# Patient Record
Sex: MALE | Race: OTHER
[De-identification: names, ages, dates, MRNs, and addresses within clinical notes are randomized per-mention and may not be internally consistent; named-entity substitution may affect disease eponyms.]

---

## 2018-04-27 ENCOUNTER — HOSPITAL ENCOUNTER (EMERGENCY)
Dept: HOSPITAL 62 - ER | Age: 53
Discharge: HOME | End: 2018-04-27
Payer: SELF-PAY

## 2018-04-27 VITALS — DIASTOLIC BLOOD PRESSURE: 74 MMHG | SYSTOLIC BLOOD PRESSURE: 123 MMHG

## 2018-04-27 DIAGNOSIS — I10: ICD-10-CM

## 2018-04-27 DIAGNOSIS — R05: ICD-10-CM

## 2018-04-27 DIAGNOSIS — R11.0: ICD-10-CM

## 2018-04-27 DIAGNOSIS — M79.1: Primary | ICD-10-CM

## 2018-04-27 PROCEDURE — 99283 EMERGENCY DEPT VISIT LOW MDM: CPT

## 2018-04-27 NOTE — ER DOCUMENT REPORT
HPI





- HPI


Pain Level: 3


Context: 





Patient is a 53-year-old male who presents with his wife with a chief complaint 

of flulike symptoms.  Wife states that she was diagnosed with influenza last 

Friday was started on medication that she does not know the name of.  She 

states that her  started feeling ill on Tuesday with body aches, cough.  

Has had an intermittent fever on and off throughout the week.  Patient denies 

any tobacco use, alcohol or drug use.  States he has been taking Tylenol at 

home for pain but otherwise denies any other over-the-counter medications.  

Past medical history significant for hypertension.





- CONSTITUTIONAL


Constitutional: REPORTS: Fever, Chills





- EENT


EENT: REPORTS: Sore Throat.  DENIES: Ear Pain, Eye problems





- NEURO


Neurology: REPORTS: Headache, Weakness.  DENIES: Vision blurred, Dizzinesss / 

Vertigo





- CARDIOVASCULAR


Cardiovascular: DENIES: Chest pain





- RESPIRATORY


Respiratory: REPORTS: Coughing.  DENIES: Trouble Breathing





- GASTROINTESTINAL


Gastrointestinal: REPORTS: Abdominal Pain.  DENIES: Black / Bloody Stools





- URINARY


Urinary: DENIES: Dysuria, Urgency, Frequency





- REPRODUCTIVE


Reproductive: DENIES: Pregnant:





- MUSCULOSKELETAL


Musculoskeletal: REPORTS: Extremity pain





Past Medical History





- Social History


Smoking Status: Never Smoker


Chew tobacco use (# tins/day): No


Frequency of alcohol use: None


Drug Abuse: None


Family History: Reviewed & Not Pertinent


Patient has suicidal ideation: No


Patient has homicidal ideation: No





- Past Medical History


Cardiac Medical History: Reports: Hx Hypertension


Renal/ Medical History: Denies: Hx Peritoneal Dialysis


GI Medical History: Reports: Hx Gastroesophageal Reflux Disease





Vertical Provider Document





- CONSTITUTIONAL


Agree With Documented VS: Yes


Notes: 





PHYSICAL EXAM


GENERAL: Alert, interacts well. 


HEENT: NCAT, pale conjunctiva, extraocular movements intact, pupils PERRL. 

external ear normal, no evidence of external auditory canal tenderness, blood/

drainage, cerumen impaction, TM intact without evidence of effusion, bulging, 

injection, MMM, Uvula midline.  Airway patent. No evidence of tonsillar 

enlargement, peritonsillar abscess, retropharyngeal abscess.


NECK: Full range of motion. Supple. Trachea midline.


LUNGS: Clear to auscultation bilaterally, no wheezes, rales, or rhonchi. No 

respiratory distress.


HEART: Regular rate and rhythm. No murmurs, gallops, or rubs.


ABDOMEN: Soft,  nondistended, nontender. No guarding, rebound, or rigidity.. 

Bowel sounds present in all 4 quadrants.


EXTREMITIES: Moves all 4 extremities spontaneously. No edema, radial and 

dorsalis pedis pulses 2/4 bilaterally. No cyanosis. 


NEUROLOGICAL: Alert and oriented x4. Normal speech.


PSYCH: Normal affect, normal mood.


SKIN: Warm, dry, normal turgor. No rashes or lesions noted.








- INFECTION CONTROL


TRAVEL OUTSIDE OF THE [place] IN LAST 30 DAYS: No





Course





- Re-evaluation


Re-evalutation: 





04/27/18 11:49 


Patient presents with cough, reflux, nausea and fever at home consistent with a 

diagnosis of influenza given recent sick contacts at home.  Patient is overall 

well in appearance, in no acute distress.  Lung sounds clear.  Able to tolerate 

oral intake without difficulty here in the emergency department.  Patient is 

outside of the window for Tamiflu. At this time will discharge with return 

precautions and follow-up recommendations.  Verbal discharge instructions given 

a the bedside and opportunity for questions given. Medication warnings 

reviewed. Patient is in agreement with this plan and has verbalized 

understanding of return precautions and the need for primary care follow-up in 

the next 24-72 hours.





- Vital Signs


Vital signs: 


 











Temp Pulse Resp BP Pulse Ox


 


 99.1 F   79   16   123/74   97 


 


 04/27/18 11:17  04/27/18 11:17  04/27/18 11:17  04/27/18 11:17  04/27/18 11:17














Discharge





- Discharge


Clinical Impression: 


 Flu-like symptoms





Condition: Good


Disposition: HOME, SELF-CARE


Additional Instructions: 


You have influenza.  There is no treatment that is effective for this diagnosis 

other than supportive care at home. This includes drinking plenty of fluids, 

using Tylenol or ibuprofen as needed for fever and discomfort, and Zofran as 

needed for nausea and vomiting.  Please follow closely with you primary care 

physician the next 1-2 days regarding this diagnosis.  Return to the emergency 

department immediately if you began to have persistent vomiting prevents you 

from being able to keep fluids down for more than 12 hours, you pass out, you 

began having difficulty breathing, you become confused, or you have any other 

symptoms that are worrisome to you.





You can take pepcid for heartburn, this is available over the counter


Prescriptions: 


Benzonatate [Tessalon Perles 100 mg Capsule] 100 mg PO ASDIR PRN #40 capsule


 PRN Reason: 


Ondansetron [Zofran Odt 4 mg Tablet] 1 - 2 tab PO Q4H PRN #15 tab.rapdis


 PRN Reason: For Nausea/Vomiting


Referrals: 


ZEESHAN GUTIERREZ MD [ACTIVE STAFF] - Follow up in 1 week

## 2018-05-11 ENCOUNTER — HOSPITAL ENCOUNTER (OUTPATIENT)
Dept: HOSPITAL 62 - RAD | Age: 53
End: 2018-05-11
Payer: COMMERCIAL

## 2018-05-11 DIAGNOSIS — R63.4: ICD-10-CM

## 2018-05-11 DIAGNOSIS — K21.9: ICD-10-CM

## 2018-05-11 DIAGNOSIS — R11.10: ICD-10-CM

## 2018-05-11 DIAGNOSIS — K44.9: Primary | ICD-10-CM

## 2018-05-11 DIAGNOSIS — R13.10: ICD-10-CM

## 2018-05-11 PROCEDURE — 74220 X-RAY XM ESOPHAGUS 1CNTRST: CPT

## 2018-05-11 NOTE — RADIOLOGY REPORT (SQ)
EXAM DESCRIPTION:  BARIUM SWALLOW ESOPHAGUS



COMPLETED DATE/TIME:  5/11/2018 9:10 am



REASON FOR STUDY:  SOLID FOOD DYSPHAGIA/WT LOSS/VOMITING R13.10  DYSPHAGIA, UNSPECIFIED R63.4  ABNORM
AL WEIGHT LOSS R11.10  VOMITING, UNSPECIFIED



COMPARISON:  None.



TECHNIQUE:  Under fluoroscopic guidance, patient ingested effervescent granules followed by thick and
 thin barium. Fluoroscopic spot images and routine radiographic images acquired and stored on PACS.

12 MM BARIUM TABLET GIVEN: Yes.

No significant delay in passage.



LIMITATIONS:  None.



FLUOROSCOPY TIME:  FLUORO TIME: 20 SECONDS

11 fluoroscopy images saved to PACS.



FINDINGS:  NEUROMUSCULAR COORDINATION OF SWALLOW: Normal. No aspiration.

ESOPHAGEAL MOTILITY: Normal peristalsis. No esophageal spasm.

ESOPHAGEAL MUCOSA: Normal mucosa without masses or ulceration.

GASTRO-ESOPHAGEAL JUNCTION: Small sliding hiatal hernia.  Unprovoked gastroesophageal reflux extendin
g into the upper 1/3 of the esophagus.

NON-GI TRACT STRUCTURES: No significant finding.

OTHER: No other significant finding.



IMPRESSION:  1. SMALL SLIDING HIATAL HERNIA.

2. SEVERE GASTROESOPHAGEAL REFLUX.



COMMENT:  Quality :  Final reports for procedures using fluoroscopy that document radiation exp
osure indices, or exposure time and number of fluorographic images (if radiation exposure indices are
 not available)



TECHNICAL DOCUMENTATION:  JOB ID:  [id number]

 2011 [employer]- All Rights Reserved



Reading location - IP/workstation name: [facility]

## 2018-05-19 ENCOUNTER — HOSPITAL ENCOUNTER (OUTPATIENT)
Dept: HOSPITAL 62 - CCC | Age: 53
End: 2018-05-19
Payer: COMMERCIAL

## 2018-05-19 DIAGNOSIS — R11.10: ICD-10-CM

## 2018-05-19 DIAGNOSIS — R13.10: Primary | ICD-10-CM

## 2018-05-19 DIAGNOSIS — R63.4: ICD-10-CM

## 2018-05-19 LAB
ADD MANUAL DIFF: NO
ALBUMIN SERPL-MCNC: 4.7 G/DL (ref 3.5–5)
ALP SERPL-CCNC: 47 U/L (ref 38–126)
ALT SERPL-CCNC: 23 U/L (ref 21–72)
ANION GAP SERPL CALC-SCNC: 11 MMOL/L (ref 5–19)
AST SERPL-CCNC: 21 U/L (ref 17–59)
BASOPHILS # BLD AUTO: 0 10^3/UL (ref 0–0.2)
BASOPHILS NFR BLD AUTO: 0.3 % (ref 0–2)
BILIRUB DIRECT SERPL-MCNC: 0.3 MG/DL (ref 0–0.4)
BILIRUB SERPL-MCNC: 0.5 MG/DL (ref 0.2–1.3)
BUN SERPL-MCNC: 19 MG/DL (ref 7–20)
CALCIUM: 10.5 MG/DL (ref 8.4–10.2)
CHLORIDE SERPL-SCNC: 101 MMOL/L (ref 98–107)
CHOLEST SERPL-MCNC: 195.02 MG/DL (ref 0–200)
CO2 SERPL-SCNC: 32 MMOL/L (ref 22–30)
EOSINOPHIL # BLD AUTO: 0.1 10^3/UL (ref 0–0.6)
EOSINOPHIL NFR BLD AUTO: 0.6 % (ref 0–6)
ERYTHROCYTE [DISTWIDTH] IN BLOOD BY AUTOMATED COUNT: 12.6 % (ref 11.5–14)
GLUCOSE SERPL-MCNC: 103 MG/DL (ref 75–110)
HCT VFR BLD CALC: 42.9 % (ref 37.9–51)
HGB BLD-MCNC: 14.3 G/DL (ref 13.5–17)
LDLC SERPL DIRECT ASSAY-MCNC: 100 MG/DL (ref ?–100)
LYMPHOCYTES # BLD AUTO: 1.5 10^3/UL (ref 0.5–4.7)
LYMPHOCYTES NFR BLD AUTO: 14.9 % (ref 13–45)
MCH RBC QN AUTO: 27.9 PG (ref 27–33.4)
MCHC RBC AUTO-ENTMCNC: 33.3 G/DL (ref 32–36)
MCV RBC AUTO: 84 FL (ref 80–97)
MONOCYTES # BLD AUTO: 0.8 10^3/UL (ref 0.1–1.4)
MONOCYTES NFR BLD AUTO: 8 % (ref 3–13)
NEUTROPHILS # BLD AUTO: 7.5 10^3/UL (ref 1.7–8.2)
NEUTS SEG NFR BLD AUTO: 76.2 % (ref 42–78)
PLATELET # BLD: 290 10^3/UL (ref 150–450)
POTASSIUM SERPL-SCNC: 5.8 MMOL/L (ref 3.6–5)
PROT SERPL-MCNC: 8.8 G/DL (ref 6.3–8.2)
PSA SERPL-MCNC: 0.82 NG/ML (ref ?–4)
RBC # BLD AUTO: 5.13 10^6/UL (ref 4.35–5.55)
SODIUM SERPL-SCNC: 144.1 MMOL/L (ref 137–145)
TOTAL CELLS COUNTED % (AUTO): 100 %
TRIGL SERPL-MCNC: 279 MG/DL (ref ?–150)
VLDLC SERPL CALC-MCNC: 55.8 MG/DL (ref 10–31)
WBC # BLD AUTO: 9.9 10^3/UL (ref 4–10.5)

## 2018-05-19 PROCEDURE — 84443 ASSAY THYROID STIM HORMONE: CPT

## 2018-05-19 PROCEDURE — 83036 HEMOGLOBIN GLYCOSYLATED A1C: CPT

## 2018-05-19 PROCEDURE — 85025 COMPLETE CBC W/AUTO DIFF WBC: CPT

## 2018-05-19 PROCEDURE — 36415 COLL VENOUS BLD VENIPUNCTURE: CPT

## 2018-05-19 PROCEDURE — 84153 ASSAY OF PSA TOTAL: CPT

## 2018-05-19 PROCEDURE — 80053 COMPREHEN METABOLIC PANEL: CPT

## 2018-05-19 PROCEDURE — 80061 LIPID PANEL: CPT

## 2018-06-19 ENCOUNTER — HOSPITAL ENCOUNTER (OUTPATIENT)
Dept: HOSPITAL 62 - CCC | Age: 53
End: 2018-06-19
Payer: COMMERCIAL

## 2018-06-19 DIAGNOSIS — E87.5: ICD-10-CM

## 2018-06-19 DIAGNOSIS — E83.52: Primary | ICD-10-CM

## 2018-06-19 LAB
CALCIUM: 10.2 MG/DL (ref 8.4–10.2)
POTASSIUM SERPL-SCNC: 5.6 MMOL/L (ref 3.6–5)

## 2018-06-19 PROCEDURE — 84132 ASSAY OF SERUM POTASSIUM: CPT

## 2018-06-19 PROCEDURE — 82310 ASSAY OF CALCIUM: CPT

## 2018-06-19 PROCEDURE — 36415 COLL VENOUS BLD VENIPUNCTURE: CPT

## 2018-09-27 ENCOUNTER — HOSPITAL ENCOUNTER (EMERGENCY)
Dept: HOSPITAL 62 - ER | Age: 53
Discharge: HOME | End: 2018-09-27
Payer: COMMERCIAL

## 2018-09-27 VITALS — DIASTOLIC BLOOD PRESSURE: 81 MMHG | SYSTOLIC BLOOD PRESSURE: 141 MMHG

## 2018-09-27 DIAGNOSIS — I10: ICD-10-CM

## 2018-09-27 DIAGNOSIS — M51.37: ICD-10-CM

## 2018-09-27 DIAGNOSIS — M47.9: Primary | ICD-10-CM

## 2018-09-27 PROCEDURE — 99283 EMERGENCY DEPT VISIT LOW MDM: CPT

## 2018-09-27 PROCEDURE — 72110 X-RAY EXAM L-2 SPINE 4/>VWS: CPT

## 2018-09-27 NOTE — RADIOLOGY REPORT (SQ)
EXAM DESCRIPTION:  L SPINE WHOLE



COMPLETED DATE/TIME:  9/27/2018 8:26 am



REASON FOR STUDY:  back pain



COMPARISON:  None.



NUMBER OF VIEWS:  Five views including obliques.



TECHNIQUE:  AP, lateral, oblique, and sacral radiographic images acquired of the lumbar spine.



LIMITATIONS:  None.



FINDINGS:  MINERALIZATION: Normal.

SEGMENTATION: Normal.  No transitional anatomy.

ALIGNMENT: Normal.

VERTEBRAE: Maintained height.  No fracture or worrisome bone lesion.

DISCS:  Moderate severe decrease in disc space at L5-S1.  Minimal anterior osteophytes.

POSTERIOR ELEMENTS: Pedicles and facets are intact.  No pars defect or posterior arch defects.

HARDWARE: None in the spine.

PARASPINAL SOFT TISSUES: Normal.

PELVIS: Intact as visualized. No fractures or worrisome bone lesions. SI joints intact.

OTHER: No other significant finding.



IMPRESSION:  1.  Degenerative disc disease at L5-S1.

2. No acute osseous findings.



TECHNICAL DOCUMENTATION:  JOB ID:  [id number]

 2011 [employer]- All Rights Reserved



Reading location - IP/workstation name: [email or web address]

## 2018-09-27 NOTE — ER DOCUMENT REPORT
ED Neck/Back Problem





- General


Chief Complaint: Back Pain


Stated Complaint: BACK PAIN


Time Seen by Provider: 09/27/18 07:54


TRAVEL OUTSIDE OF THE [place] IN LAST 30 DAYS: No





- HPI


Notes: 





Patient is a 53-year-old male that presents to the emergency department for 

chief complaint of back pain.


Patient has had low back pain previously but states it is more severe today.  

He has had exacerbation of his chronic low back pain for the last 3 days.  He 

states he was lifting and twisting while at work which is what aggravated it.  

He has been taking ibuprofen at home with some relief.  He denies any fevers, 

chills, saddle anesthesia, bowel or bladder incontinence and lower extremity 

weakness.  He denies any direct fall or trauma to the area.  He denies IV drug 

use.





Past Medical History: Negative





Past Surgical History: Negative





Social History: Denies drugs alcohol and tobacco





Family History: Reviewed and noncontributory for presenting illness





Allergies: Reviewed, see documented allergy list.








REVIEW OF SYSTEMS:





CONSTITUTIONAL : 





No fever





No chills





No diaphoresis





No recent illness





EENT:





No vision changes





No congestion





No sore throat  





CARDIOVASCULAR:





No chest pain





No palpitations





RESPIRATORY:





No shortness of breath





No cough





No difficulty breathing





GASTROINTESTINAL: 





No abdominal pain





No nausea





No vomiting





No diarrhea





GENITOURINARY:





No dysuria





No hematuria





No difficulty urinating





MUSCULOSKELETAL:





back pain





No leg pain





No arm pain





SKIN:  





No rashes





No lesions





LYMPHATIC: 





No swollen, enlarged glands.





NEUROLOGICAL: 





No lightheadedness





No headache





No weakness





No paresthesias





PSYCHIATRIC:





No anxiety





No depression 








PHYSICAL EXAMINATION:





Vital signs reviewed, nursing noted reviewed.





GENERAL: Well-appearing, well-nourished and in no acute distress.





HEAD: Atraumatic, normocephalic.





EYES: Eyes appear normal, extraocular movements intact, sclera anicteric, 

conjunctiva are normal.





ENT: nares patent, oropharynx clear without exudates.  Moist mucous membranes.





NECK: Normal range of motion, supple without lymphadenopathy





BACK: Midline lumbar tenderness L4-L5 with no step-off.  Mild bilateral 

paraspinal lumbar tenderness and spasm.  Normal thoracic spine





LUNGS: Breath sounds clear to auscultation bilaterally and equal.  No wheezes 

rales or rhonchi.





HEART: Regular rate and rhythm without murmurs





ABDOMEN: Soft, nontender, normoactive bowel sounds.  No rebound, guarding, or 

rigidity. No masses appreciated.





EXTREMITIES: Nontender, good range of motion, no pitting or edema. 





NEUROLOGICAL: No focal neurological deficits. Moves all extremities 

spontaneously Motor and sensory grossly intact on exam.





PSYCH: Normal mood, normal affect.





SKIN: Warm, Dry, normal turgor, no rashes or lesions noted on exposed skin











- Related Data


Allergies/Adverse Reactions: 


 





No Known Allergies Allergy (Unverified 04/27/18 11:13)


 











Past Medical History





- Social History


Smoking Status: Never Smoker


Family History: Reviewed & Not Pertinent





- Past Medical History


Cardiac Medical History: Reports: Hx Hypertension


Renal/ Medical History: Denies: Hx Peritoneal Dialysis


GI Medical History: Reports: Hx Gastroesophageal Reflux Disease





Review of Systems





- Review of Systems


Notes: 





Dictated





Physical Exam





- Vital signs


Vitals: 


 











Temp Pulse Resp BP Pulse Ox


 


 97.5 F   61   16   141/81 H  98 


 


 09/27/18 07:39  09/27/18 07:39  09/27/18 07:39  09/27/18 07:39  09/27/18 07:39














- Notes


Notes: 





Dictated





Course





- Re-evaluation


Re-evalutation: 





09/27/18 08:03


Vitals reviewed.  Nursing notes reviewed.  Patient given Percocet for pain.  X-

ray of the lumbar spine obtained.  He is not having any symptoms consistent 

with cauda equina, epidural abscess or transverse myelitis or discitis.  He is 

able to ambulate and has full range of motion of the lumbar spine.  He is 

nontoxic-appearing.  Patient will be discharged home with referral to primary 

care for follow-up and possible physical therapy referral if symptoms continue.


09/27/18 08:41





 





Lumbar Spine X-Ray  09/27/18 08:00


IMPRESSION:  1.  Degenerative disc disease at L5-S1.


2. No acute osseous findings.


 








On reevaluation patient was symptomatically improved.





- Vital Signs


Vital signs: 


 











Temp Pulse Resp BP Pulse Ox


 


 97.5 F   61   16   141/81 H  98 


 


 09/27/18 07:39  09/27/18 07:39  09/27/18 07:39  09/27/18 07:39  09/27/18 07:39














Discharge





- Discharge


Clinical Impression: 


 Degenerative joint disease of low back





Back pain


Qualifiers:


 Back pain location: low back pain Chronicity: acute Back pain laterality: 

midline Sciatica presence: without sciatica Qualified Code(s): M54.5 - Low back 

pain





Condition: Stable


Disposition: HOME, SELF-CARE


Instructions:  Low Back Pain ([facility])


Additional Instructions: 


Please return to the emergency department if you have any worsening, or concern 

of your symptoms.





Please return to the emergency department if you develop chest pain, difficulty 

breathing, severe abdominal pain, or ongoing vomiting.





Please follow-up with your primary care physician in 2-3 days and any other 

recommended physicians.





If prescribed, take all medications as directed. 





If you have any questions or concerns do not hesitate to return the emergency 

department for evaluation.





Return if you have any lower extremity numbness or weakness, incontinence of 

your urine or stool, fevers, difficulty walking.





Prescriptions: 


Naproxen 500 mg PO BID #30 tablet


Referrals: 


[facility] [NO LOCAL MD] - Follow up in 3-5 days

## 2019-08-07 ENCOUNTER — HOSPITAL ENCOUNTER (OUTPATIENT)
Dept: HOSPITAL 62 - CCC | Age: 54
End: 2019-08-07
Payer: COMMERCIAL

## 2019-08-07 DIAGNOSIS — I10: Primary | ICD-10-CM

## 2019-08-07 LAB
ADD MANUAL DIFF: NO
ALBUMIN SERPL-MCNC: 4.7 G/DL (ref 3.5–5)
ALP SERPL-CCNC: 50 U/L (ref 38–126)
ANION GAP SERPL CALC-SCNC: 10 MMOL/L (ref 5–19)
AST SERPL-CCNC: 29 U/L (ref 17–59)
BASOPHILS # BLD AUTO: 0 10^3/UL (ref 0–0.2)
BASOPHILS NFR BLD AUTO: 0.5 % (ref 0–2)
BILIRUB DIRECT SERPL-MCNC: 0.3 MG/DL (ref 0–0.4)
BILIRUB SERPL-MCNC: 0.5 MG/DL (ref 0.2–1.3)
BUN SERPL-MCNC: 21 MG/DL (ref 7–20)
CALCIUM: 9.5 MG/DL (ref 8.4–10.2)
CHLORIDE SERPL-SCNC: 100 MMOL/L (ref 98–107)
CHOLEST SERPL-MCNC: 235.63 MG/DL (ref 0–200)
CO2 SERPL-SCNC: 30 MMOL/L (ref 22–30)
EOSINOPHIL # BLD AUTO: 0.1 10^3/UL (ref 0–0.6)
EOSINOPHIL NFR BLD AUTO: 1.1 % (ref 0–6)
ERYTHROCYTE [DISTWIDTH] IN BLOOD BY AUTOMATED COUNT: 12.7 % (ref 11.5–14)
GLUCOSE SERPL-MCNC: 104 MG/DL (ref 75–110)
HCT VFR BLD CALC: 44.7 % (ref 37.9–51)
HGB BLD-MCNC: 14.9 G/DL (ref 13.5–17)
LDLC SERPL DIRECT ASSAY-MCNC: 172 MG/DL (ref ?–100)
LYMPHOCYTES # BLD AUTO: 1.3 10^3/UL (ref 0.5–4.7)
LYMPHOCYTES NFR BLD AUTO: 19.8 % (ref 13–45)
MCH RBC QN AUTO: 28 PG (ref 27–33.4)
MCHC RBC AUTO-ENTMCNC: 33.4 G/DL (ref 32–36)
MCV RBC AUTO: 84 FL (ref 80–97)
MONOCYTES # BLD AUTO: 0.5 10^3/UL (ref 0.1–1.4)
MONOCYTES NFR BLD AUTO: 7.3 % (ref 3–13)
NEUTROPHILS # BLD AUTO: 4.7 10^3/UL (ref 1.7–8.2)
NEUTS SEG NFR BLD AUTO: 71.3 % (ref 42–78)
PLATELET # BLD: 242 10^3/UL (ref 150–450)
POTASSIUM SERPL-SCNC: 4.2 MMOL/L (ref 3.6–5)
PROT SERPL-MCNC: 8.5 G/DL (ref 6.3–8.2)
PSA SERPL-MCNC: 0.68 NG/ML (ref ?–4)
RBC # BLD AUTO: 5.32 10^6/UL (ref 4.35–5.55)
TOTAL CELLS COUNTED % (AUTO): 100 %
TRIGL SERPL-MCNC: 241 MG/DL (ref ?–150)
VLDLC SERPL CALC-MCNC: 48.2 MG/DL (ref 10–31)
WBC # BLD AUTO: 6.6 10^3/UL (ref 4–10.5)

## 2019-08-07 PROCEDURE — 84443 ASSAY THYROID STIM HORMONE: CPT

## 2019-08-07 PROCEDURE — 84153 ASSAY OF PSA TOTAL: CPT

## 2019-08-07 PROCEDURE — 36415 COLL VENOUS BLD VENIPUNCTURE: CPT

## 2019-08-07 PROCEDURE — 80061 LIPID PANEL: CPT

## 2019-08-07 PROCEDURE — 80053 COMPREHEN METABOLIC PANEL: CPT

## 2019-08-07 PROCEDURE — 85025 COMPLETE CBC W/AUTO DIFF WBC: CPT

## 2019-08-07 PROCEDURE — 83036 HEMOGLOBIN GLYCOSYLATED A1C: CPT

## 2020-01-14 ENCOUNTER — HOSPITAL ENCOUNTER (OUTPATIENT)
Dept: HOSPITAL 62 - CCC | Age: 55
End: 2020-01-14
Payer: COMMERCIAL

## 2020-01-14 DIAGNOSIS — R07.9: ICD-10-CM

## 2020-01-14 DIAGNOSIS — R06.02: ICD-10-CM

## 2020-01-14 DIAGNOSIS — I10: Primary | ICD-10-CM

## 2020-01-14 PROCEDURE — 93005 ELECTROCARDIOGRAM TRACING: CPT

## 2020-01-14 PROCEDURE — 93010 ELECTROCARDIOGRAM REPORT: CPT

## 2020-01-14 NOTE — EKG REPORT
SEVERITY:- BORDERLINE ECG -

SINUS RHYTHM

BORDERLINE T WAVE ABNORMALITIES

:

Confirmed by: Rizwana Goldman MD 14-Jan-2020 11:20:52

## 2020-01-30 ENCOUNTER — HOSPITAL ENCOUNTER (OUTPATIENT)
Dept: HOSPITAL 62 - SP | Age: 55
End: 2020-01-30
Payer: COMMERCIAL

## 2020-01-30 DIAGNOSIS — E78.00: ICD-10-CM

## 2020-01-30 DIAGNOSIS — R06.02: ICD-10-CM

## 2020-01-30 DIAGNOSIS — I10: ICD-10-CM

## 2020-01-30 DIAGNOSIS — R07.89: Primary | ICD-10-CM

## 2020-01-30 PROCEDURE — 93017 CV STRESS TEST TRACING ONLY: CPT

## 2020-01-30 NOTE — DRAGON STRESS TEST REPORT
EXERCISE TREADMILL TEST.



DATE OF PROCEDURE: January 30, 2020



INDICATION: Patient with unspecified chest pain.  



Coronary risk factors: Elevated cholesterol and blood pressure.



Resting EKG: Sinus rhythm, no baseline ST segment changes.



Stress EKG: No significant changes noted with with exercise treadmill.



Reason for termination: Dyspnea and fatigue.





PROCEDURE REPORT: 



Baseline heart rate: 78 beats per minute with blood pressure of 153/93.  Patient
had no significant complaints at baseline.



Patient was exercised on a standard [facility]smar protocol.  Patient exercised for total
of 7 minutes and 0 seconds.  Exercise was stopped because of fatigue and 
shortness of breath.  Patient denied any chest arm or neck discomfort during the
exercise, at peak exercise or in recovery.



If automatic blood pressure recorded and if felt not accurate manual blood 
pressure then were recorded at appropriate intervals.



Peak heart rate: 151 bpm, more than 85% of predicted maximum.



Peak blood pressure: 173/96 mmHg.



Double product: Adequate and more than 20 kcal kcal



Exercise EKG: Showed some baseline artifact during exercise but no significant 
ST segment changes noted.



CONCLUSIONS: Adequate heart rate and blood pressure response.

                       Below average exercise tolerance.

                       Negative EKG changes with exercise.



RECOMMENDATIONS:



Aggressive risk factor modification, medical therapy.

Consider cardiology consultation if clinically indicated.







Monster Kaiser M.D., [place]

Consultant cardiologist,

Board certified in cardiovascular diseases, 

Nuclear cardiology, 

Echocardiography

Cardiac CT and cardiac MRI

Ph. [phone] 

Ph. [phone] 

[facility]

## 2020-02-25 ENCOUNTER — HOSPITAL ENCOUNTER (OUTPATIENT)
Dept: HOSPITAL 62 - RAD | Age: 55
End: 2020-02-25
Payer: COMMERCIAL

## 2020-02-25 DIAGNOSIS — M51.36: Primary | ICD-10-CM

## 2020-02-25 PROCEDURE — 72132 CT LUMBAR SPINE W/DYE: CPT

## 2020-02-25 PROCEDURE — 82565 ASSAY OF CREATININE: CPT

## 2020-02-26 NOTE — RADIOLOGY REPORT (SQ)
EXAM DESCRIPTION:  CT LUMBAR SPINE WITH



COMPLETED DATE/TIME:  2/25/2020 1:03 pm



REASON FOR STUDY:  M51.36 OTHER INTERVERTEBRAL DISC DEGENERATION, LUMBAR REGION



COMPARISON:  None.



TECHNIQUE:  Axial images acquired through the lumbar spine with intravenous contrast.  Images reviewe
d with lung, soft tissue and bone windows.  Reconstructed coronal and sagittal MPR images reviewed.  
All images stored on PACS.

All CT scanners at this facility use dose modulation, iterative reconstruction, and/or weight based d
osing when appropriate to reduce radiation dose to as low as reasonably achievable (ALARA).

[facility]: Dose Right  [facility]: CareDose    [facility]: Dose Right    [facility]: Teradose 4D    [facility]: Smart Technologies



CONTRAST TYPE AND DOSE:  100 mL Omnipaque 350.



RENAL FUNCTION:  Creatinine 1.1.



LIMITATIONS:  None.



FINDINGS:  SEGMENTATION: Normal.  No transitional anatomy.

ALIGNMENT: Normal.

VERTEBRAL BODIES: No fractures.  No dislocation.  No acute findings.

DISCS: Diffuse posterior disc bulge at L4-L5.  Disc space narrowing with sclerosis and vacuum change 
at L5-S1. Study limited by lack of intrathecal contrast.

PEDICLES, TRANSVERSE PROCESSES: No fractures.  No dislocation.  No acute findings.

FACETS, POSTERIOR ELEMENTS: No fractures.  No dislocation.  No spinal stenosis.

HARDWARE: None in the spine.

VISUALIZED RIBS: No fractures.

SOFT TISSUES: No significant or acute finding in adjacent soft tissues.

OTHER: No other significant finding.



IMPRESSION:  DEGENERATIVE DISC DISEASE IN THE LOWER LUMBAR SPINE.  DIFFUSE DISC BULGE AT L4-L5.  NO H
IGH-GRADE STENOSIS.



TECHNICAL DOCUMENTATION:  JOB ID:  [id number]

 2011 [employer]- All Rights Reserved



Reading location - IP/workstation name: [email or web address]